# Patient Record
Sex: FEMALE | Race: ASIAN | NOT HISPANIC OR LATINO
[De-identification: names, ages, dates, MRNs, and addresses within clinical notes are randomized per-mention and may not be internally consistent; named-entity substitution may affect disease eponyms.]

---

## 2017-05-01 ENCOUNTER — APPOINTMENT (OUTPATIENT)
Dept: CARDIOLOGY | Facility: CLINIC | Age: 62
End: 2017-05-01

## 2017-05-01 VITALS
SYSTOLIC BLOOD PRESSURE: 122 MMHG | WEIGHT: 144 LBS | HEIGHT: 63 IN | RESPIRATION RATE: 15 BRPM | DIASTOLIC BLOOD PRESSURE: 54 MMHG | HEART RATE: 44 BPM | BODY MASS INDEX: 25.52 KG/M2

## 2017-05-01 DIAGNOSIS — I10 ESSENTIAL (PRIMARY) HYPERTENSION: ICD-10-CM

## 2017-05-01 DIAGNOSIS — R00.1 BRADYCARDIA, UNSPECIFIED: ICD-10-CM

## 2017-05-01 DIAGNOSIS — R94.31 ABNORMAL ELECTROCARDIOGRAM [ECG] [EKG]: ICD-10-CM

## 2017-05-01 DIAGNOSIS — E78.5 HYPERLIPIDEMIA, UNSPECIFIED: ICD-10-CM

## 2017-05-01 PROBLEM — Z00.00 ENCOUNTER FOR PREVENTIVE HEALTH EXAMINATION: Status: ACTIVE | Noted: 2017-05-01

## 2017-05-01 RX ORDER — ONDANSETRON 4 MG/1
4 TABLET, ORALLY DISINTEGRATING ORAL
Qty: 10 | Refills: 0 | Status: ACTIVE | COMMUNITY
Start: 2017-03-21

## 2017-05-01 RX ORDER — AMOXICILLIN 500 MG/1
500 CAPSULE ORAL
Qty: 30 | Refills: 0 | Status: ACTIVE | COMMUNITY
Start: 2017-03-07

## 2017-05-01 RX ORDER — BRIMONIDINE TARTRATE 1.5 MG/ML
0.15 SOLUTION/ DROPS OPHTHALMIC
Qty: 5 | Refills: 0 | Status: ACTIVE | COMMUNITY
Start: 2016-11-25

## 2017-05-01 RX ORDER — RALOXIFENE HYDROCHLORIDE 60 MG/1
60 TABLET, FILM COATED ORAL
Qty: 30 | Refills: 0 | Status: ACTIVE | COMMUNITY
Start: 2016-08-27

## 2017-05-01 RX ORDER — ATORVASTATIN CALCIUM 10 MG/1
10 TABLET, FILM COATED ORAL
Qty: 30 | Refills: 0 | Status: ACTIVE | COMMUNITY
Start: 2016-08-27

## 2017-05-01 RX ORDER — FAMOTIDINE 20 MG/1
20 TABLET, FILM COATED ORAL
Qty: 60 | Refills: 0 | Status: ACTIVE | COMMUNITY
Start: 2017-01-17

## 2017-05-01 RX ORDER — VALSARTAN AND HYDROCHLOROTHIAZIDE 320; 25 MG/1; MG/1
320-25 TABLET, FILM COATED ORAL
Qty: 30 | Refills: 0 | Status: ACTIVE | COMMUNITY
Start: 2016-08-31

## 2017-05-01 RX ORDER — AMLODIPINE BESYLATE 5 MG/1
5 TABLET ORAL
Qty: 30 | Refills: 0 | Status: ACTIVE | COMMUNITY
Start: 2016-08-27

## 2017-05-01 RX ORDER — MELOXICAM 15 MG/1
15 TABLET ORAL
Qty: 30 | Refills: 0 | Status: ACTIVE | COMMUNITY
Start: 2016-08-31

## 2017-05-01 RX ORDER — TRAMADOL HYDROCHLORIDE AND ACETAMINOPHEN 37.5; 325 MG/1; MG/1
37.5-325 TABLET, FILM COATED ORAL
Qty: 60 | Refills: 0 | Status: ACTIVE | COMMUNITY
Start: 2017-01-17

## 2017-05-01 RX ORDER — LATANOPROST/PF 0.005 %
0.01 DROPS OPHTHALMIC (EYE)
Qty: 2 | Refills: 0 | Status: ACTIVE | COMMUNITY
Start: 2016-11-25

## 2017-05-01 RX ORDER — CALCIUM CARBONATE/VITAMIN D3 500MG-5MCG
500-200 TABLET ORAL
Qty: 60 | Refills: 0 | Status: ACTIVE | COMMUNITY
Start: 2017-01-17

## 2017-05-01 RX ORDER — OMEGA-3/DHA/EPA/FISH OIL 300-1000MG
1000 CAPSULE ORAL
Qty: 60 | Refills: 0 | Status: ACTIVE | COMMUNITY
Start: 2016-08-27

## 2017-05-01 RX ORDER — DORZOLAMIDE HYDROCHLORIDE TIMOLOL MALEATE 20; 5 MG/ML; MG/ML
22.3-6.8 SOLUTION/ DROPS OPHTHALMIC
Qty: 10 | Refills: 0 | Status: ACTIVE | COMMUNITY
Start: 2016-05-31

## 2017-05-01 RX ORDER — LOPERAMIDE HYDROCHLORIDE 2 MG/1
2 CAPSULE ORAL
Qty: 20 | Refills: 0 | Status: ACTIVE | COMMUNITY
Start: 2017-03-21

## 2017-05-01 RX ORDER — CIPROFLOXACIN HYDROCHLORIDE 500 MG/1
500 TABLET, FILM COATED ORAL
Qty: 6 | Refills: 0 | Status: ACTIVE | COMMUNITY
Start: 2017-03-21

## 2017-05-01 RX ORDER — OMEPRAZOLE 20 MG/1
20 TABLET, DELAYED RELEASE ORAL
Qty: 30 | Refills: 0 | Status: ACTIVE | COMMUNITY
Start: 2016-08-27

## 2018-04-09 ENCOUNTER — APPOINTMENT (OUTPATIENT)
Dept: CARDIOLOGY | Facility: CLINIC | Age: 63
End: 2018-04-09
Payer: MEDICAID

## 2018-04-09 VITALS
SYSTOLIC BLOOD PRESSURE: 134 MMHG | WEIGHT: 142 LBS | BODY MASS INDEX: 25.16 KG/M2 | HEART RATE: 42 BPM | RESPIRATION RATE: 12 BRPM | DIASTOLIC BLOOD PRESSURE: 80 MMHG | HEIGHT: 63 IN

## 2018-04-09 PROCEDURE — 99215 OFFICE O/P EST HI 40 MIN: CPT

## 2018-04-09 PROCEDURE — 93000 ELECTROCARDIOGRAM COMPLETE: CPT

## 2018-04-09 RX ORDER — HYDROCORTISONE 1 %
12 CREAM (GRAM) TOPICAL
Qty: 225 | Refills: 0 | Status: ACTIVE | COMMUNITY
Start: 2018-02-22

## 2018-04-09 RX ORDER — MAGNESIUM OXIDE 241.3 MG/1000MG
400 TABLET ORAL
Qty: 30 | Refills: 0 | Status: ACTIVE | COMMUNITY
Start: 2018-02-22

## 2018-04-09 RX ORDER — MOMETASONE FUROATE 1 MG/G
0.1 OINTMENT TOPICAL
Qty: 30 | Refills: 0 | Status: ACTIVE | COMMUNITY
Start: 2018-02-22

## 2018-04-09 RX ORDER — VITAMIN B COMPLEX
TABLET ORAL
Qty: 30 | Refills: 0 | Status: ACTIVE | COMMUNITY
Start: 2018-02-22

## 2018-04-09 RX ORDER — MECLIZINE HYDROCHLORIDE 12.5 MG/1
12.5 TABLET ORAL
Qty: 40 | Refills: 0 | Status: ACTIVE | COMMUNITY
Start: 2017-11-06

## 2018-04-17 ENCOUNTER — APPOINTMENT (OUTPATIENT)
Dept: CARDIOLOGY | Facility: CLINIC | Age: 63
End: 2018-04-17
Payer: MEDICAID

## 2018-04-17 DIAGNOSIS — R07.9 CHEST PAIN, UNSPECIFIED: ICD-10-CM

## 2018-04-17 PROCEDURE — 93015 CV STRESS TEST SUPVJ I&R: CPT

## 2018-04-17 PROCEDURE — A9500: CPT

## 2018-04-17 PROCEDURE — 78452 HT MUSCLE IMAGE SPECT MULT: CPT

## 2018-04-17 PROCEDURE — 93306 TTE W/DOPPLER COMPLETE: CPT

## 2018-04-30 ENCOUNTER — APPOINTMENT (OUTPATIENT)
Dept: CARDIOLOGY | Facility: CLINIC | Age: 63
End: 2018-04-30
Payer: MEDICAID

## 2018-04-30 VITALS
HEART RATE: 61 BPM | BODY MASS INDEX: 24.8 KG/M2 | SYSTOLIC BLOOD PRESSURE: 143 MMHG | RESPIRATION RATE: 15 BRPM | HEIGHT: 63 IN | WEIGHT: 140 LBS | DIASTOLIC BLOOD PRESSURE: 71 MMHG

## 2018-04-30 PROCEDURE — 99215 OFFICE O/P EST HI 40 MIN: CPT

## 2018-05-07 ENCOUNTER — FORM ENCOUNTER (OUTPATIENT)
Age: 63
End: 2018-05-07

## 2018-05-07 VITALS
RESPIRATION RATE: 16 BRPM | SYSTOLIC BLOOD PRESSURE: 141 MMHG | HEART RATE: 45 BPM | OXYGEN SATURATION: 96 % | DIASTOLIC BLOOD PRESSURE: 65 MMHG

## 2018-05-07 NOTE — H&P ADULT - ASSESSMENT
61yo Sinhala speaking female with PMHx significant for HTN, hyperlipidemia, bradycardia, and moderate to severe Mitral Regurgitation presented to cardiologist c/o CCS III anginal equivalent symptoms and abnormal stress test who presents for right and left heart catheterization with possible intervention.     - Consent obtained via Georgian  ID # [id number]    Risks & benefits of procedure and alternative therapy have been explained to the patient including but not limited to: allergic reaction, bleeding w/possible need for blood transfusion, infection, renal and vascular compromise, limb damage, arrhythmia, stroke, vessel dissection/perforation, Myocardial infarction, emergent CABG. Informed consent obtained and in chart. 61yo Polish speaking female with PMHx significant for HTN, hyperlipidemia, bradycardia, and moderate to severe Mitral Regurgitation presented to cardiologist c/o CCS III anginal equivalent symptoms and abnormal stress test who presents for right and left heart catheterization with possible intervention.     - Consent obtained via Japanese  ID # [id number]  - As discussed with Dr. Harrsi no plavix loading as patient with moderate to severe MR.  Will give ASA 81mg PO x1 prior to procedure.     Risks & benefits of procedure and alternative therapy have been explained to the patient including but not limited to: allergic reaction, bleeding w/possible need for blood transfusion, infection, renal and vascular compromise, limb damage, arrhythmia, stroke, vessel dissection/perforation, Myocardial infarction, emergent CABG. Informed consent obtained and in chart. 63yo Occitan speaking female with PMHx significant for HTN, hyperlipidemia, bradycardia, and moderate to severe Mitral Regurgitation presented to cardiologist c/o CCS III anginal equivalent symptoms and abnormal stress test who presents for right and left heart catheterization with possible intervention.     - Consent obtained via Lithuanian  ID # [id number]  - As discussed with Dr. Harris no plavix loading as patient with moderate to severe MR.  Will give ASA 81mg PO x1 prior to procedure.   - potassium 3.7, repleted with Kdur 40mg PO x1    Risks & benefits of procedure and alternative therapy have been explained to the patient including but not limited to: allergic reaction, bleeding w/possible need for blood transfusion, infection, renal and vascular compromise, limb damage, arrhythmia, stroke, vessel dissection/perforation, Myocardial infarction, emergent CABG. Informed consent obtained and in chart.

## 2018-05-07 NOTE — H&P ADULT - FAMILY HISTORY
Mother  Still living? Unknown  Family history of hypertension, Age at diagnosis: Age Unknown     Father  Still living? Unknown  Family history of diabetes mellitus, Age at diagnosis: Age Unknown

## 2018-05-07 NOTE — H&P ADULT - PMH
Bradycardia    Glaucoma    H pylori ulcer  2014  HTN (hypertension)    Hyperlipidemia    Non-rheumatic mitral regurgitation

## 2018-05-07 NOTE — H&P ADULT - HISTORY OF PRESENT ILLNESS
61yo Greek speaking female with PMHx significant for HTN, hyperlipidemia, bradycardia, and moderate to severe Mitral Regurgitation presented to cardiologist c/o severe exertional chest discomfort described as tearing in nature that radiates to back and is relieved with rest over the past few months. Associated symptoms includes dyspnea, fatigue, palpitations, dizziness and lightheadedness.   Patient had recommended cardiac workup included an echocardiogram 4/17/2018 that showed moderate to severe MR, dilated LA, normal LV systolic function, no segmental wall motion abnormalities  with LVEF 65-70%, estimated PSAP 46mmHg c/w mild pulmonary pressures, nuclear stress test 4/18/2018 that showed normal LV function, medium sized, mild defects in anterior and anterolateral walls that are partially reversible, suggestive for mild OM ischemia, and Holter Monitor 4/18/2018 that showed rhythm is sinus with marked sinus bradycardia, moderate SV ectopy, rare VE with a couplet.  In light of CCS III anginal equivalent symptoms and abnormal stress test, patient now presents for cardiac catheterization with possible intervention. 61yo Japanese speaking female with PMHx significant for HTN, hyperlipidemia, bradycardia, and moderate to severe Mitral Regurgitation presented to cardiologist c/o severe exertional chest discomfort described as tearing in nature that radiates to back and is relieved with rest over the past few months. Associated symptoms includes dyspnea, fatigue, palpitations, dizziness and lightheadedness.   Patient had recommended cardiac workup included an echocardiogram 4/17/2018 that showed moderate to severe MR, dilated LA, normal LV systolic function, no segmental wall motion abnormalities  with LVEF 65-70%, estimated PSAP 46mmHg c/w mild pulmonary pressures, nuclear stress test 4/18/2018 that showed normal LV function, medium sized, mild defects in anterior and anterolateral walls that are partially reversible, suggestive for mild OM ischemia, and Holter Monitor 4/18/2018 that showed rhythm is sinus with marked sinus bradycardia, moderate SV ectopy, rare VE with a couplet.  In light of CCS III anginal equivalent symptoms and abnormal stress test, patient now presents for right and left heart catheterization with possible intervention. 63yo Kiswahili speaking female with PMHx significant for HTN, hyperlipidemia, bradycardia, and moderate to severe Mitral Regurgitation presented to cardiologist c/o severe exertional chest discomfort described as tearing in nature that radiates to back and is relieved with rest over the past few months. Associated symptoms includes dyspnea, fatigue, palpitations, dizziness and lightheadedness.  Patient had recommended cardiac workup included an echocardiogram 4/17/2018 that showed moderate to severe MR, dilated LA, normal LV systolic function, no segmental wall motion abnormalities  with LVEF 65-70%, estimated PSAP 46mmHg c/w mild pulmonary pressures, nuclear stress test 4/18/2018 that showed normal LV function, medium sized, mild defects in anterior and anterolateral walls that are partially reversible, suggestive for mild OM ischemia, and Holter Monitor 4/18/2018 that showed rhythm is sinus with marked sinus bradycardia, moderate SV ectopy, rare VE with a couplet.  In light of CCS III anginal equivalent symptoms and abnormal stress test, patient now presents for right and left heart catheterization with possible intervention.

## 2018-05-08 ENCOUNTER — OUTPATIENT (OUTPATIENT)
Dept: OUTPATIENT SERVICES | Facility: HOSPITAL | Age: 63
LOS: 1 days | Discharge: HOME | End: 2018-05-08
Payer: MEDICAID

## 2018-05-08 DIAGNOSIS — Z90.49 ACQUIRED ABSENCE OF OTHER SPECIFIED PARTS OF DIGESTIVE TRACT: Chronic | ICD-10-CM

## 2018-05-08 LAB
ALBUMIN SERPL ELPH-MCNC: 4.3 G/DL — SIGNIFICANT CHANGE UP (ref 3.3–5)
ALP SERPL-CCNC: 40 U/L — SIGNIFICANT CHANGE UP (ref 40–120)
ALT FLD-CCNC: 21 U/L — SIGNIFICANT CHANGE UP (ref 10–45)
ANION GAP SERPL CALC-SCNC: 12 MMOL/L — SIGNIFICANT CHANGE UP (ref 5–17)
APTT BLD: 31.1 SEC — SIGNIFICANT CHANGE UP (ref 27.5–37.4)
AST SERPL-CCNC: 21 U/L — SIGNIFICANT CHANGE UP (ref 10–40)
BASOPHILS NFR BLD AUTO: 0.3 % — SIGNIFICANT CHANGE UP (ref 0–2)
BILIRUB SERPL-MCNC: 0.7 MG/DL — SIGNIFICANT CHANGE UP (ref 0.2–1.2)
BUN SERPL-MCNC: 24 MG/DL — HIGH (ref 7–23)
CALCIUM SERPL-MCNC: 9 MG/DL — SIGNIFICANT CHANGE UP (ref 8.4–10.5)
CHLORIDE SERPL-SCNC: 106 MMOL/L — SIGNIFICANT CHANGE UP (ref 96–108)
CHOLEST SERPL-MCNC: 175 MG/DL — SIGNIFICANT CHANGE UP (ref 10–199)
CK MB CFR SERPL CALC: 1.2 NG/ML — SIGNIFICANT CHANGE UP (ref 0–6.7)
CK SERPL-CCNC: 61 U/L — SIGNIFICANT CHANGE UP (ref 25–170)
CO2 SERPL-SCNC: 25 MMOL/L — SIGNIFICANT CHANGE UP (ref 22–31)
CREAT SERPL-MCNC: 0.52 MG/DL — SIGNIFICANT CHANGE UP (ref 0.5–1.3)
CRP SERPL-MCNC: <0.03 MG/DL — SIGNIFICANT CHANGE UP (ref 0–0.4)
EOSINOPHIL NFR BLD AUTO: 1.2 % — SIGNIFICANT CHANGE UP (ref 0–6)
GLUCOSE SERPL-MCNC: 106 MG/DL — HIGH (ref 70–99)
HCT VFR BLD CALC: 38.2 % — SIGNIFICANT CHANGE UP (ref 34.5–45)
HDLC SERPL-MCNC: 77 MG/DL — SIGNIFICANT CHANGE UP (ref 40–125)
HGB BLD-MCNC: 12.8 G/DL — SIGNIFICANT CHANGE UP (ref 11.5–15.5)
INR BLD: 0.99 — SIGNIFICANT CHANGE UP (ref 0.88–1.16)
LIPID PNL WITH DIRECT LDL SERPL: 78 MG/DL — SIGNIFICANT CHANGE UP
LYMPHOCYTES # BLD AUTO: 34.7 % — SIGNIFICANT CHANGE UP (ref 13–44)
MCHC RBC-ENTMCNC: 31.1 PG — SIGNIFICANT CHANGE UP (ref 27–34)
MCHC RBC-ENTMCNC: 33.5 G/DL — SIGNIFICANT CHANGE UP (ref 32–36)
MCV RBC AUTO: 92.7 FL — SIGNIFICANT CHANGE UP (ref 80–100)
MONOCYTES NFR BLD AUTO: 5.7 % — SIGNIFICANT CHANGE UP (ref 2–14)
NEUTROPHILS NFR BLD AUTO: 58.1 % — SIGNIFICANT CHANGE UP (ref 43–77)
PLATELET # BLD AUTO: 280 K/UL — SIGNIFICANT CHANGE UP (ref 150–400)
POTASSIUM SERPL-MCNC: 3.7 MMOL/L — SIGNIFICANT CHANGE UP (ref 3.5–5.3)
POTASSIUM SERPL-SCNC: 3.7 MMOL/L — SIGNIFICANT CHANGE UP (ref 3.5–5.3)
PROT SERPL-MCNC: 7.3 G/DL — SIGNIFICANT CHANGE UP (ref 6–8.3)
PROTHROM AB SERPL-ACNC: 11 SEC — SIGNIFICANT CHANGE UP (ref 9.8–12.7)
RBC # BLD: 4.12 M/UL — SIGNIFICANT CHANGE UP (ref 3.8–5.2)
RBC # FLD: 13.7 % — SIGNIFICANT CHANGE UP (ref 10.3–16.9)
SODIUM SERPL-SCNC: 143 MMOL/L — SIGNIFICANT CHANGE UP (ref 135–145)
TOTAL CHOLESTEROL/HDL RATIO MEASUREMENT: 2.3 RATIO — LOW (ref 3.3–7.1)
TRIGL SERPL-MCNC: 101 MG/DL — SIGNIFICANT CHANGE UP (ref 10–149)
WBC # BLD: 6.4 K/UL — SIGNIFICANT CHANGE UP (ref 3.8–10.5)
WBC # FLD AUTO: 6.4 K/UL — SIGNIFICANT CHANGE UP (ref 3.8–10.5)

## 2018-05-08 PROCEDURE — 82553 CREATINE MB FRACTION: CPT

## 2018-05-08 PROCEDURE — C1760: CPT

## 2018-05-08 PROCEDURE — 80053 COMPREHEN METABOLIC PANEL: CPT

## 2018-05-08 PROCEDURE — C1894: CPT

## 2018-05-08 PROCEDURE — 99235 HOSP IP/OBS SAME DATE MOD 70: CPT

## 2018-05-08 PROCEDURE — 93306 TTE W/DOPPLER COMPLETE: CPT | Mod: 26

## 2018-05-08 PROCEDURE — C1889: CPT

## 2018-05-08 PROCEDURE — 85025 COMPLETE CBC W/AUTO DIFF WBC: CPT

## 2018-05-08 PROCEDURE — 85730 THROMBOPLASTIN TIME PARTIAL: CPT

## 2018-05-08 PROCEDURE — 93460 R&L HRT ART/VENTRICLE ANGIO: CPT | Mod: 26

## 2018-05-08 PROCEDURE — 80061 LIPID PANEL: CPT

## 2018-05-08 PROCEDURE — 93460 R&L HRT ART/VENTRICLE ANGIO: CPT

## 2018-05-08 PROCEDURE — 86140 C-REACTIVE PROTEIN: CPT

## 2018-05-08 PROCEDURE — C1769: CPT

## 2018-05-08 PROCEDURE — C1887: CPT

## 2018-05-08 PROCEDURE — 82550 ASSAY OF CK (CPK): CPT

## 2018-05-08 PROCEDURE — 93306 TTE W/DOPPLER COMPLETE: CPT

## 2018-05-08 PROCEDURE — 85610 PROTHROMBIN TIME: CPT

## 2018-05-08 RX ORDER — BRIMONIDINE TARTRATE 2 MG/MG
1 SOLUTION/ DROPS OPHTHALMIC
Qty: 0 | Refills: 0 | COMMUNITY

## 2018-05-08 RX ORDER — MELOXICAM 15 MG/1
1 TABLET ORAL
Qty: 0 | Refills: 0 | COMMUNITY

## 2018-05-08 RX ORDER — SODIUM CHLORIDE 9 MG/ML
500 INJECTION INTRAMUSCULAR; INTRAVENOUS; SUBCUTANEOUS
Qty: 0 | Refills: 0 | Status: DISCONTINUED | OUTPATIENT
Start: 2018-05-08 | End: 2018-05-08

## 2018-05-08 RX ORDER — DORZOLAMIDE HYDROCHLORIDE TIMOLOL MALEATE 20; 5 MG/ML; MG/ML
0 SOLUTION/ DROPS OPHTHALMIC
Qty: 0 | Refills: 0 | COMMUNITY

## 2018-05-08 RX ORDER — RALOXIFENE HYDROCHLORIDE 60 MG/1
1 TABLET, COATED ORAL
Qty: 0 | Refills: 0 | COMMUNITY

## 2018-05-08 RX ORDER — POTASSIUM CHLORIDE 20 MEQ
40 PACKET (EA) ORAL ONCE
Qty: 0 | Refills: 0 | Status: COMPLETED | OUTPATIENT
Start: 2018-05-08 | End: 2018-05-08

## 2018-05-08 RX ORDER — ASPIRIN/CALCIUM CARB/MAGNESIUM 324 MG
81 TABLET ORAL ONCE
Qty: 0 | Refills: 0 | Status: COMPLETED | OUTPATIENT
Start: 2018-05-08 | End: 2018-05-08

## 2018-05-08 RX ORDER — AMLODIPINE BESYLATE 2.5 MG/1
1 TABLET ORAL
Qty: 0 | Refills: 0 | COMMUNITY

## 2018-05-08 RX ORDER — ATORVASTATIN CALCIUM 80 MG/1
1 TABLET, FILM COATED ORAL
Qty: 0 | Refills: 0 | COMMUNITY

## 2018-05-08 RX ORDER — LATANOPROST 0.05 MG/ML
1 SOLUTION/ DROPS OPHTHALMIC; TOPICAL
Qty: 0 | Refills: 0 | COMMUNITY

## 2018-05-08 RX ORDER — MAGNESIUM OXIDE 400 MG ORAL TABLET 241.3 MG
1 TABLET ORAL
Qty: 0 | Refills: 0 | COMMUNITY

## 2018-05-08 RX ORDER — ASPIRIN/CALCIUM CARB/MAGNESIUM 324 MG
1 TABLET ORAL
Qty: 0 | Refills: 0 | COMMUNITY

## 2018-05-08 RX ADMIN — SODIUM CHLORIDE 50 MILLILITER(S): 9 INJECTION INTRAMUSCULAR; INTRAVENOUS; SUBCUTANEOUS at 13:46

## 2018-05-08 RX ADMIN — Medication 40 MILLIEQUIVALENT(S): at 13:46

## 2018-05-08 NOTE — PROGRESS NOTE ADULT - SUBJECTIVE AND OBJECTIVE BOX
Interventional Cardiology PA SDA Discharge Note    Patient without complaints. Ambulated and voided without difficulties    Afebrile, VSS    Ext:    		Right  Groin:   no    hematoma,  no   bruit, dressing; C/D/I  		    Pulses:    intact DP/PT to baseline     A/P:      63 y/o Welsh speaking female with history of glaucoma, HTN, hyperlipidemia, bradycardia, and Mitral Regurgitation who has been c/o exertional chest discomfort and abnormal stress test 4/18/2018 that showed normal LV function, medium sized, mild defects in anterior and anterolateral walls that are partially reversible, suggestive for mild OM ischemia.  Pt presented for cardiac cath today which showed non-obstructive CAD, 1+MR,.  EP was consulted due to c/o dizziness with bradycardia.  Pt noted to have outpt Holter monitor 4/2018 showing SR with occasional APCs, PVC, and HR ranging 38-90.  Echo at [facility] today showed normal LVEF and mild-moderate MR.      As d/w EP team, pt's dizziness felt to be related to positional change and it is recommended for pt to stop her Timolol eye drops which could be contributing to her bradycardia.  Pt should then have repeat Holter study in 1 [facility] off Timolol and f/u with her Ophthalmologist for furte management of her glaucoma.       1.	Stable for discharge as per attending Dr. Prince and Dr Green after bed rest, pt voids, groin stable and 30 minutes of ambulation.    2.          Follow-up with Cardiologist Dr Geren on Monday 5/14/18 and Ophthalmologist next week.   3.          Instructions regarding medication and stopping Dorzolamide/Timolol given to patient with letter to give to her Ophthalologist.   4.	Discharged forms signed and copies in chart

## 2018-05-08 NOTE — CONSULT NOTE ADULT - ASSESSMENT
61 y/o Turkish speaking female with history of glaucoma (on timolol), HTN, bradycardia, and mild to moderate MR, normal LVEF, non-obstructive CAD by cath.    - Her dizziness is related to positional change and when she turns the head from side to side.  She denies syncope. Her sinus bradycardia is likely caused by Timolol eye drop. We recommend stopping Timolol and see her Ophthalmologist to evaluate for a replacing agent.  After stopping Timolol for 1 month, she should have re-evaluation with Holter monitor with Dr. Green.

## 2018-05-08 NOTE — CONSULT NOTE ADULT - SUBJECTIVE AND OBJECTIVE BOX
HISTORY OF PRESENT ILLNESS:   63 y/o Khmer speaking female with history of glaucoma, HTN, hyperlipidemia, bradycardia, and Mitral Regurgitation who has been c/o exertional chest discomfort and abnormal stress test 4/18/2018 that showed normal LV function, medium sized, mild defects in anterior and anterolateral walls that are partially reversible, suggestive for mild OM ischemia.  She is now s/p cardiac cath with non-obstructive disease.  Echo at [facility] today showed normal LVEF and mild-moderate MR.    She also c/o dizziness that occurs only with positional change (sitting to standing) and when she turns her head from side to side. She states that she would get "palpitations" when she exerts herself, ie when she runs to catch the bus. Denies syncope.   Holter Monitor in 4/17/18 showed sinus rhythm with HR range 38 (in the morning) - average 51 bpm - max 93 bpm. No CHB / pause. Occasional PACs and PVC.  Beside the positional dizziness, she does not recall any remarkable event that day when she wore the Holter Monitor.   Of note, she uses  dorzolamide-timolol eye drop BID for 4 years now. Last use was this morning.   EPS is called to evaluate for bradycardia.       PAST MEDICAL AND SURGICAL HISTORY:  Bradycardia  Non-rheumatic mitral regurgitation  H pylori ulcer: 2014  Glaucoma  Hyperlipidemia  HTN (hypertension)  S/P cholecystectomy      FAMILY HISTORY:   Family history of diabetes mellitus (Father)  Family history of hypertension (Mother)      SOCIAL HISTORY:   Denies ETOH  Denies TOB  Denies illicit drugs  Works as a home health aid.     REVIEW OF SYSTEM:   CONSTITUTIONAL: No weakness, fevers or chills  EYES/ENT: No visual changes;  No vertigo or throat pain   NECK: No pain or stiffness  RESPIRATORY: No cough, wheezing, hemoptysis; No shortness of breath  CARDIOVASCULAR: See HPI   GASTROINTESTINAL: No abdominal or epigastric pain. No nausea, vomiting, or hematemesis; No diarrhea or constipation. No melena or hematochezia.  GENITOURINARY: No dysuria, frequency or hematuria  MUSCULOSKELETAL: Denies joint pain or swelling   NEUROLOGICAL: No numbness or weakness  SKIN: No itching, burning, rashes, or lesions   All other review of systems is negative unless indicated above.    ALLERGY:  No Known Allergies      HOME MEDICATIONS:          ·      dorzolamide-timolol ophthalmic: Last Dose Taken:    · 	tramadol-acetaminophen 37.5 mg-325 mg oral tablet: 1 tab(s) orally every 4 hours, As Needed, Last Dose Taken:    · 	valsartan-hydrochlorothiazide 320mg-25mg oral tablet: 1 tab(s) orally once a day, Last Dose Taken:    · 	raloxifene 60 mg oral tablet: 1 tab(s) orally once a day, Last Dose Taken:    · 	atorvastatin 10 mg oral tablet: 1 tab(s) orally once a day, Last Dose Taken:    · 	Norvasc 5 mg oral tablet: 1 tab(s) orally once a day, Last Dose Taken:    · 	Mag-Ox 400 oral tablet: 1 tab(s) orally once a day, Last Dose Taken:    · 	Aspirin Enteric Coated 81 mg oral delayed release tablet: 1 tab(s) orally once a day, Last Dose Taken:    · 	brimonidine 0.15% ophthalmic solution: 1 drop(s) to each affected eye 2 times a day, Last Dose Taken:    · 	meloxicam 15 mg oral tablet: 1 tab(s) orally once a day, As Needed, Last Dose Taken:            ·      latanoprost 0.005% ophthalmic solution: 1 drop(s) to each affected eye once a day (in the evening), Last Dose Taken:       VITAL SIGNS:   T(C): 98F  HR: 45 bpm  BP: 131/67  RR: 14  SpO2: 98% RA    PHYSICAL EXAM:   Appearance: NAD  CVS: S1/S2 normal, bradycardic, 1/6 systolic murmur LSB  Pulm: CTA bilaterally. No wheeze or rales  Abd:  +BS, Soft, NT/ND	  Ext: No LE edema  Neuro: AAOx 3. No focal deficit.    LABS:                        12.8   6.4   )-----------( 280      ( 08 May 2018 12:42 )             38.2     05-08    143  |  106  |  24<H>  ----------------------------<  106<H>  3.7   |  25  |  0.52    Ca    9.0      08 May 2018 12:42    TPro  7.3  /  Alb  4.3  /  TBili  0.7  /  DBili  x   /  AST  21  /  ALT  21  /  AlkPhos  40  05-08    PT/INR - ( 08 May 2018 12:42 )   PT: 11.0 sec;   INR: 0.99          PTT - ( 08 May 2018 12:42 )  PTT:31.1 sec   LIVER FUNCTIONS - ( 08 May 2018 12:42 )  Alb: 4.3 g/dL / Pro: 7.3 g/dL / ALK PHOS: 40 U/L / ALT: 21 U/L / AST: 21 U/L / GGT: x             EKG: Sinus navin 44 bpm. Normal intervals. (, QRS 78,  ms)    ECHO: < from: Echocardiogram (05.08.18 @ 15:54) >  Normal left ventricular size and wall thickness. The left ventricular wall   motion is normal. The left ventricular ejection fraction is normal. The   left ventricular ejection fraction is 65%. The right ventricle is not well   visualized. The left atrial size is normal. Right atrial size is   normal. Structurally normal mitral valve. There is mild to moderate mitral   regurgitation. Other valves normal in structure and function. The pulmonary   artery systolic pressure is estimated to be 25 mmHg. There is no   pericardial

## 2018-05-12 PROBLEM — R00.1 BRADYCARDIA, UNSPECIFIED: Chronic | Status: ACTIVE | Noted: 2018-05-07

## 2018-05-12 PROBLEM — K27.9 PEPTIC ULCER, SITE UNSPECIFIED, UNSPECIFIED AS ACUTE OR CHRONIC, WITHOUT HEMORRHAGE OR PERFORATION: Chronic | Status: ACTIVE | Noted: 2018-05-07

## 2018-05-12 PROBLEM — I10 ESSENTIAL (PRIMARY) HYPERTENSION: Chronic | Status: ACTIVE | Noted: 2018-05-07

## 2018-05-12 PROBLEM — E78.5 HYPERLIPIDEMIA, UNSPECIFIED: Chronic | Status: ACTIVE | Noted: 2018-05-07

## 2018-05-12 PROBLEM — I34.0 NONRHEUMATIC MITRAL (VALVE) INSUFFICIENCY: Chronic | Status: ACTIVE | Noted: 2018-05-07

## 2018-05-12 PROBLEM — H40.9 UNSPECIFIED GLAUCOMA: Chronic | Status: ACTIVE | Noted: 2018-05-07

## 2018-05-14 ENCOUNTER — APPOINTMENT (OUTPATIENT)
Dept: CARDIOLOGY | Facility: CLINIC | Age: 63
End: 2018-05-14
Payer: MEDICAID

## 2018-05-14 VITALS
DIASTOLIC BLOOD PRESSURE: 76 MMHG | BODY MASS INDEX: 24.98 KG/M2 | SYSTOLIC BLOOD PRESSURE: 134 MMHG | HEIGHT: 63 IN | HEART RATE: 56 BPM | RESPIRATION RATE: 12 BRPM | WEIGHT: 141 LBS

## 2018-05-14 PROCEDURE — 99214 OFFICE O/P EST MOD 30 MIN: CPT

## 2018-05-14 PROCEDURE — 93000 ELECTROCARDIOGRAM COMPLETE: CPT

## 2018-06-11 ENCOUNTER — APPOINTMENT (OUTPATIENT)
Dept: CARDIOLOGY | Facility: CLINIC | Age: 63
End: 2018-06-11
Payer: MEDICAID

## 2018-06-11 VITALS
RESPIRATION RATE: 16 BRPM | BODY MASS INDEX: 24.8 KG/M2 | SYSTOLIC BLOOD PRESSURE: 146 MMHG | DIASTOLIC BLOOD PRESSURE: 75 MMHG | HEART RATE: 75 BPM | WEIGHT: 140 LBS | HEIGHT: 63 IN

## 2018-06-11 PROCEDURE — 93000 ELECTROCARDIOGRAM COMPLETE: CPT

## 2018-06-11 PROCEDURE — 99214 OFFICE O/P EST MOD 30 MIN: CPT

## 2018-08-20 ENCOUNTER — APPOINTMENT (OUTPATIENT)
Dept: CARDIOLOGY | Facility: CLINIC | Age: 63
End: 2018-08-20
Payer: MEDICAID

## 2018-08-20 VITALS
BODY MASS INDEX: 24.98 KG/M2 | RESPIRATION RATE: 12 BRPM | SYSTOLIC BLOOD PRESSURE: 154 MMHG | DIASTOLIC BLOOD PRESSURE: 70 MMHG | HEART RATE: 62 BPM | HEIGHT: 63 IN | WEIGHT: 141 LBS

## 2018-08-20 PROCEDURE — 99214 OFFICE O/P EST MOD 30 MIN: CPT

## 2018-10-22 ENCOUNTER — APPOINTMENT (OUTPATIENT)
Dept: CARDIOLOGY | Facility: CLINIC | Age: 63
End: 2018-10-22

## 2023-07-03 NOTE — H&P ADULT - MS GEN HX ROS MEA POS PC
back pain Propranolol Pregnancy And Lactation Text: This medication is Pregnancy Category C and it isn't known if it is safe during pregnancy. It is excreted in breast milk.

## 2024-04-23 ENCOUNTER — RX ONLY (RX ONLY)
Age: 69
End: 2024-04-23

## 2024-04-25 ENCOUNTER — OFFICE (OUTPATIENT)
Facility: LOCATION | Age: 69
Setting detail: OPHTHALMOLOGY
End: 2024-04-25
Payer: COMMERCIAL

## 2024-04-25 DIAGNOSIS — H16.223: ICD-10-CM

## 2024-04-25 DIAGNOSIS — H40.1131: ICD-10-CM

## 2024-04-25 PROCEDURE — LEGACY BIL LEGACY BILLING: Performed by: OPHTHALMOLOGY

## 2024-08-16 ENCOUNTER — OFFICE (OUTPATIENT)
Facility: LOCATION | Age: 69
Setting detail: OPHTHALMOLOGY
End: 2024-08-16
Payer: MEDICARE

## 2024-08-16 DIAGNOSIS — H16.223: ICD-10-CM

## 2024-08-16 DIAGNOSIS — H40.1131: ICD-10-CM

## 2024-08-16 PROCEDURE — 99212 OFFICE O/P EST SF 10 MIN: CPT | Performed by: OPHTHALMOLOGY

## 2024-08-16 ASSESSMENT — CONFRONTATIONAL VISUAL FIELD TEST (CVF)
OD_FINDINGS: FULL
OS_FINDINGS: FULL

## 2024-11-25 ENCOUNTER — OFFICE (OUTPATIENT)
Facility: LOCATION | Age: 69
Setting detail: OPHTHALMOLOGY
End: 2024-11-25
Payer: MEDICARE

## 2024-11-25 DIAGNOSIS — H43.393: ICD-10-CM

## 2024-11-25 DIAGNOSIS — E11.9: ICD-10-CM

## 2024-11-25 DIAGNOSIS — H40.1133: ICD-10-CM

## 2024-11-25 DIAGNOSIS — H16.223: ICD-10-CM

## 2024-11-25 PROCEDURE — 92014 COMPRE OPH EXAM EST PT 1/>: CPT | Performed by: OPHTHALMOLOGY

## 2024-11-25 ASSESSMENT — CONFRONTATIONAL VISUAL FIELD TEST (CVF)
OD_FINDINGS: FULL
OS_FINDINGS: FULL

## 2024-11-25 ASSESSMENT — REFRACTION_MANIFEST
OS_SPHERE: -2.75
OD_VA1: 20/30-1
OS_AXIS: 169
OS_CYLINDER: -1.00
OS_AXIS: 175
OS_SPHERE: -2.50
OS_CYLINDER: -0.50
OD_VA1: 20/30
OD_AXIS: 005
OD_SPHERE: -2.00
OU_VA: 20/25-2
OD_CYLINDER: -0.75
OS_VA1: 20/30
OS_VA1: 20/30-1
OD_AXIS: 007
OD_CYLINDER: -2.00
OU_VA: 20/25-2
OD_SPHERE: -2.25

## 2024-11-25 ASSESSMENT — DECREASING TEAR LAKE - SEVERITY SCORE
OD_DEC_TEARLAKE: 2+
OS_DEC_TEARLAKE: 2+

## 2024-11-25 ASSESSMENT — REFRACTION_CURRENTRX
OS_OVR_VA: 20/
OS_SPHERE: -2.75
OS_CYLINDER: -0.50
OD_OVR_VA: 20/
OD_VPRISM_DIRECTION: SV
OS_VPRISM_DIRECTION: SV
OS_AXIS: 170
OD_AXIS: 005
OD_CYLINDER: -0.75
OD_SPHERE: -2.25

## 2024-11-25 ASSESSMENT — TONOMETRY
OD_IOP_MMHG: 11
OS_IOP_MMHG: 11

## 2024-11-25 ASSESSMENT — REFRACTION_AUTOREFRACTION
OS_AXIS: 169
OS_CYLINDER: -1.00
OD_SPHERE: -2.00
OD_CYLINDER: -2.00
OD_AXIS: 007
OS_SPHERE: -2.50

## 2024-11-25 ASSESSMENT — KERATOMETRY
OD_K2POWER_DIOPTERS: 47.25
OS_K1POWER_DIOPTERS: 46.00
OD_AXISANGLE_DEGREES: 088
OD_K1POWER_DIOPTERS: 46.00
OS_AXISANGLE_DEGREES: 092
OS_K2POWER_DIOPTERS: 47.00
METHOD_AUTO_MANUAL: AUTO

## 2024-11-25 ASSESSMENT — VISUAL ACUITY
OD_BCVA: 20/25
OS_BCVA: 20/30

## 2025-02-24 ENCOUNTER — OFFICE (OUTPATIENT)
Facility: LOCATION | Age: 70
Setting detail: OPHTHALMOLOGY
End: 2025-02-24
Payer: MEDICARE

## 2025-02-24 DIAGNOSIS — H16.223: ICD-10-CM

## 2025-02-24 DIAGNOSIS — H40.1133: ICD-10-CM

## 2025-02-24 PROCEDURE — 99213 OFFICE O/P EST LOW 20 MIN: CPT | Performed by: OPHTHALMOLOGY

## 2025-02-24 PROCEDURE — 92133 CPTRZD OPH DX IMG PST SGM ON: CPT | Performed by: OPHTHALMOLOGY

## 2025-02-24 ASSESSMENT — REFRACTION_MANIFEST
OS_AXIS: 175
OS_SPHERE: -2.75
OU_VA: 20/25-2
OD_CYLINDER: -1.50
OS_CYLINDER: -0.50
OS_VA1: 20/30-1
OD_VA1: 20/30+1
OS_SPHERE: -2.50
OD_CYLINDER: -0.75
OD_VA1: 20/30
OD_AXIS: 005
OD_SPHERE: -2.25
OS_VA1: 20/25
OD_AXIS: 005
OS_CYLINDER: -1.00
OS_AXIS: 172
OD_SPHERE: -2.25
OU_VA: 20/25-2

## 2025-02-24 ASSESSMENT — REFRACTION_CURRENTRX
OD_CYLINDER: -0.75
OS_SPHERE: -2.75
OS_AXIS: 170
OS_CYLINDER: -0.50
OD_AXIS: 005
OD_OVR_VA: 20/
OD_VPRISM_DIRECTION: SV
OS_VPRISM_DIRECTION: SV
OD_SPHERE: -2.25
OS_OVR_VA: 20/

## 2025-02-24 ASSESSMENT — KERATOMETRY
METHOD_AUTO_MANUAL: AUTO
OS_K1POWER_DIOPTERS: 46.00
OD_AXISANGLE_DEGREES: 088
OS_AXISANGLE_DEGREES: 092
OD_K2POWER_DIOPTERS: 47.25
OD_K1POWER_DIOPTERS: 46.00
OS_K2POWER_DIOPTERS: 47.00

## 2025-02-24 ASSESSMENT — TONOMETRY
OD_IOP_MMHG: 13
OS_IOP_MMHG: 12

## 2025-02-24 ASSESSMENT — CONFRONTATIONAL VISUAL FIELD TEST (CVF)
OS_FINDINGS: FULL
OD_FINDINGS: FULL

## 2025-02-24 ASSESSMENT — REFRACTION_AUTOREFRACTION
OD_SPHERE: -2.25
OD_AXIS: 005
OS_CYLINDER: -1.00
OD_CYLINDER: -1.50
OS_AXIS: 172
OS_SPHERE: -2.50

## 2025-02-24 ASSESSMENT — DECREASING TEAR LAKE - SEVERITY SCORE
OS_DEC_TEARLAKE: 2+
OD_DEC_TEARLAKE: 2+

## 2025-02-24 ASSESSMENT — VISUAL ACUITY
OD_BCVA: 20/25
OS_BCVA: 20/30

## 2025-05-02 ENCOUNTER — OFFICE (OUTPATIENT)
Facility: LOCATION | Age: 70
Setting detail: OPHTHALMOLOGY
End: 2025-05-02
Payer: MEDICARE

## 2025-05-02 DIAGNOSIS — H16.223: ICD-10-CM

## 2025-05-02 DIAGNOSIS — H40.1133: ICD-10-CM

## 2025-05-02 PROBLEM — H25.13 CATARACT SENILE NUCLEAR SCLEROSIS; BOTH EYES: Status: ACTIVE | Noted: 2025-05-02

## 2025-05-02 PROCEDURE — 99213 OFFICE O/P EST LOW 20 MIN: CPT | Performed by: OPHTHALMOLOGY

## 2025-05-02 PROCEDURE — 92250 FUNDUS PHOTOGRAPHY W/I&R: CPT | Performed by: OPHTHALMOLOGY

## 2025-05-02 PROCEDURE — 92083 EXTENDED VISUAL FIELD XM: CPT | Performed by: OPHTHALMOLOGY

## 2025-05-02 ASSESSMENT — KERATOMETRY
OD_K1POWER_DIOPTERS: 46.00
METHOD_AUTO_MANUAL: AUTO
OS_K1POWER_DIOPTERS: 46.00
OD_AXISANGLE_DEGREES: 088
OS_AXISANGLE_DEGREES: 092
OD_K2POWER_DIOPTERS: 47.25
OS_K2POWER_DIOPTERS: 47.00

## 2025-05-02 ASSESSMENT — REFRACTION_CURRENTRX
OS_SPHERE: -2.75
OS_OVR_VA: 20/
OD_SPHERE: -2.25
OS_CYLINDER: -0.50
OS_AXIS: 170
OD_AXIS: 005
OD_VPRISM_DIRECTION: SV
OS_VPRISM_DIRECTION: SV
OD_OVR_VA: 20/
OD_CYLINDER: -0.75

## 2025-05-02 ASSESSMENT — REFRACTION_MANIFEST
OS_CYLINDER: -0.50
OD_SPHERE: -2.75
OS_SPHERE: -2.75
OS_VA1: 20/30-1
OD_CYLINDER: -0.75
OD_CYLINDER: -0.75
OS_CYLINDER: -0.25
OD_VA1: 20/30
OD_SPHERE: -2.25
OS_VA1: 20/30
OU_VA: 20/25-2
OS_SPHERE: -2.75
OD_AXIS: 005
OS_AXIS: 008
OD_AXIS: 005
OD_VA1: 20/30
OU_VA: 20/25-2
OS_AXIS: 175

## 2025-05-02 ASSESSMENT — REFRACTION_AUTOREFRACTION
OD_AXIS: 005
OS_CYLINDER: -0.25
OS_SPHERE: -2.75
OS_AXIS: 008
OD_CYLINDER: -0.75
OD_SPHERE: -2.75

## 2025-05-02 ASSESSMENT — VISUAL ACUITY
OD_BCVA: 20/25
OS_BCVA: 20/30

## 2025-05-02 ASSESSMENT — TONOMETRY
OS_IOP_MMHG: 10
OD_IOP_MMHG: 11

## 2025-05-02 ASSESSMENT — DECREASING TEAR LAKE - SEVERITY SCORE
OS_DEC_TEARLAKE: 2+
OD_DEC_TEARLAKE: 2+

## 2025-07-28 ENCOUNTER — OFFICE (OUTPATIENT)
Facility: LOCATION | Age: 70
Setting detail: OPHTHALMOLOGY
End: 2025-07-28
Payer: MEDICARE

## 2025-07-28 DIAGNOSIS — H25.13: ICD-10-CM

## 2025-07-28 DIAGNOSIS — H43.393: ICD-10-CM

## 2025-07-28 DIAGNOSIS — H25.12: ICD-10-CM

## 2025-07-28 PROCEDURE — 92136 OPHTHALMIC BIOMETRY: CPT | Mod: TC | Performed by: OPHTHALMOLOGY

## 2025-07-28 PROCEDURE — 92136 OPHTHALMIC BIOMETRY: CPT | Mod: LT | Performed by: OPHTHALMOLOGY

## 2025-07-28 PROCEDURE — 99214 OFFICE O/P EST MOD 30 MIN: CPT | Performed by: OPHTHALMOLOGY

## 2025-07-28 PROCEDURE — 92202 OPSCPY EXTND ON/MAC DRAW: CPT | Performed by: OPHTHALMOLOGY

## 2025-07-28 ASSESSMENT — CONFRONTATIONAL VISUAL FIELD TEST (CVF)
OD_FINDINGS: FULL
OS_FINDINGS: FULL

## 2025-07-28 ASSESSMENT — REFRACTION_MANIFEST
OS_VA1: 20/50
OS_VA1: 20/50
OS_AXIS: 178
OD_AXIS: 002
OU_VA: 20/25-2
OD_VA1: 20/40+1
OD_AXIS: 180
OS_AXIS: 180
OD_VA1: 20/40+1
OD_SPHERE: -2.50
OD_CYLINDER: -1.00
OS_CYLINDER: -1.00
OS_SPHERE: -2.75
OD_CYLINDER: -1.00
OD_SPHERE: -2.50
OS_CYLINDER: -1.00
OS_SPHERE: -2.75

## 2025-07-28 ASSESSMENT — REFRACTION_CURRENTRX
OS_AXIS: 170
OD_SPHERE: -2.25
OS_VPRISM_DIRECTION: SV
OD_AXIS: 005
OS_SPHERE: -2.75
OD_OVR_VA: 20/
OD_CYLINDER: -0.75
OS_OVR_VA: 20/
OS_CYLINDER: -0.50
OD_VPRISM_DIRECTION: SV

## 2025-07-28 ASSESSMENT — VISUAL ACUITY
OS_BCVA: 20/30
OD_BCVA: 20/50

## 2025-07-28 ASSESSMENT — KERATOMETRY
OD_K1POWER_DIOPTERS: 46.00
OD_CYLPOWER_DEGREES: 1.25
OS_AXISANGLE_DEGREES: 092
METHOD_AUTO_MANUAL: AUTO
OD_AXISANGLE2_DEGREES: 088
OS_CYLAXISANGLE_DEGREES: 92
OD_AXISANGLE_DEGREES: 088
OD_K1POWER_DIOPTERS: 46.00
OD_CYLAXISANGLE_DEGREES: 88
OD_K1K2_AVERAGE: 46.625
OS_K1K2_AVERAGE: 46.5
OD_K2POWER_DIOPTERS: 47.25
OS_K1POWER_DIOPTERS: 46.00
OD_AXISANGLE_DEGREES: 088
OS_K1POWER_DIOPTERS: 46.00
OD_K2POWER_DIOPTERS: 47.25
OS_K2POWER_DIOPTERS: 47.00
OS_K2POWER_DIOPTERS: 47.00
OS_AXISANGLE_DEGREES: 092
OS_CYLPOWER_DEGREES: 1
OS_AXISANGLE2_DEGREES: 092

## 2025-07-28 ASSESSMENT — TONOMETRY
OS_IOP_MMHG: 13
OD_IOP_MMHG: 15

## 2025-07-28 ASSESSMENT — REFRACTION_AUTOREFRACTION
OS_AXIS: 178
OD_AXIS: 002
OD_SPHERE: -2.50
OD_CYLINDER: -1.00
OS_SPHERE: -2.75
OS_CYLINDER: -1.00

## 2025-07-28 ASSESSMENT — DECREASING TEAR LAKE - SEVERITY SCORE
OS_DEC_TEARLAKE: 2+
OD_DEC_TEARLAKE: 2+